# Patient Record
Sex: FEMALE | Race: WHITE | NOT HISPANIC OR LATINO | Employment: FULL TIME | URBAN - METROPOLITAN AREA
[De-identification: names, ages, dates, MRNs, and addresses within clinical notes are randomized per-mention and may not be internally consistent; named-entity substitution may affect disease eponyms.]

---

## 2019-04-28 ENCOUNTER — OFFICE VISIT (OUTPATIENT)
Dept: URGENT CARE | Facility: CLINIC | Age: 59
End: 2019-04-28
Payer: COMMERCIAL

## 2019-04-28 VITALS
WEIGHT: 183 LBS | RESPIRATION RATE: 16 BRPM | HEIGHT: 63 IN | OXYGEN SATURATION: 98 % | BODY MASS INDEX: 32.43 KG/M2 | HEART RATE: 79 BPM | DIASTOLIC BLOOD PRESSURE: 81 MMHG | TEMPERATURE: 99.2 F | SYSTOLIC BLOOD PRESSURE: 148 MMHG

## 2019-04-28 DIAGNOSIS — H66.012 ACUTE SUPPURATIVE OTITIS MEDIA OF LEFT EAR WITH SPONTANEOUS RUPTURE OF TYMPANIC MEMBRANE, RECURRENCE NOT SPECIFIED: Primary | ICD-10-CM

## 2019-04-28 PROCEDURE — G0382 LEV 3 HOSP TYPE B ED VISIT: HCPCS | Performed by: EMERGENCY MEDICINE

## 2019-04-28 RX ORDER — ROSUVASTATIN CALCIUM 20 MG/1
TABLET, COATED ORAL
COMMUNITY
Start: 2017-05-01

## 2019-04-28 RX ORDER — AMOXICILLIN AND CLAVULANATE POTASSIUM 875; 125 MG/1; MG/1
1 TABLET, FILM COATED ORAL EVERY 12 HOURS SCHEDULED
Qty: 20 TABLET | Refills: 0 | Status: SHIPPED | OUTPATIENT
Start: 2019-04-28 | End: 2019-05-08

## 2019-04-28 RX ORDER — CLONAZEPAM 0.5 MG/1
TABLET ORAL
COMMUNITY
Start: 2019-04-03

## 2019-04-28 RX ORDER — PREDNISONE 10 MG/1
TABLET ORAL
Qty: 27 TABLET | Refills: 0 | Status: SHIPPED | OUTPATIENT
Start: 2019-04-28

## 2024-01-23 ENCOUNTER — APPOINTMENT (EMERGENCY)
Dept: RADIOLOGY | Facility: HOSPITAL | Age: 64
End: 2024-01-23
Payer: MEDICARE

## 2024-01-23 ENCOUNTER — HOSPITAL ENCOUNTER (EMERGENCY)
Facility: HOSPITAL | Age: 64
Discharge: HOME | End: 2024-01-23
Attending: STUDENT IN AN ORGANIZED HEALTH CARE EDUCATION/TRAINING PROGRAM
Payer: MEDICARE

## 2024-01-23 VITALS
HEIGHT: 63 IN | BODY MASS INDEX: 32.78 KG/M2 | OXYGEN SATURATION: 96 % | DIASTOLIC BLOOD PRESSURE: 78 MMHG | SYSTOLIC BLOOD PRESSURE: 189 MMHG | RESPIRATION RATE: 18 BRPM | TEMPERATURE: 97.8 F | WEIGHT: 185 LBS | HEART RATE: 66 BPM

## 2024-01-23 DIAGNOSIS — R07.9 CHEST PAIN, UNSPECIFIED TYPE: Primary | ICD-10-CM

## 2024-01-23 DIAGNOSIS — I10 HYPERTENSION, UNSPECIFIED TYPE: ICD-10-CM

## 2024-01-23 LAB
ANION GAP SERPL CALCULATED.3IONS-SCNC: 6 MMOL/L
BASOPHILS # BLD AUTO: 0.06 THOUSANDS/ÂΜL (ref 0–0.1)
BASOPHILS NFR BLD AUTO: 1 % (ref 0–1)
BUN SERPL-MCNC: 16 MG/DL (ref 5–25)
CALCIUM SERPL-MCNC: 9.7 MG/DL (ref 8.4–10.2)
CARDIAC TROPONIN I PNL SERPL HS: <2 NG/L
CARDIAC TROPONIN I PNL SERPL HS: <2 NG/L
CHLORIDE SERPL-SCNC: 105 MMOL/L (ref 96–108)
CO2 SERPL-SCNC: 25 MMOL/L (ref 21–32)
CREAT SERPL-MCNC: 0.95 MG/DL (ref 0.6–1.3)
EOSINOPHIL # BLD AUTO: 0.18 THOUSAND/ÂΜL (ref 0–0.61)
EOSINOPHIL NFR BLD AUTO: 2 % (ref 0–6)
ERYTHROCYTE [DISTWIDTH] IN BLOOD BY AUTOMATED COUNT: 12.2 % (ref 11.6–15.1)
GFR SERPL CREATININE-BSD FRML MDRD: 63 ML/MIN/1.73SQ M
GLUCOSE SERPL-MCNC: 105 MG/DL (ref 65–140)
HCT VFR BLD AUTO: 36.6 % (ref 34.8–46.1)
HGB BLD-MCNC: 12.4 G/DL (ref 11.5–15.4)
IMM GRANULOCYTES # BLD AUTO: 0.03 THOUSAND/UL (ref 0–0.2)
IMM GRANULOCYTES NFR BLD AUTO: 0 % (ref 0–2)
LYMPHOCYTES # BLD AUTO: 1.6 THOUSANDS/ÂΜL (ref 0.6–4.47)
LYMPHOCYTES NFR BLD AUTO: 18 % (ref 14–44)
MCH RBC QN AUTO: 28.1 PG (ref 26.8–34.3)
MCHC RBC AUTO-ENTMCNC: 33.9 G/DL (ref 31.4–37.4)
MCV RBC AUTO: 83 FL (ref 82–98)
MONOCYTES # BLD AUTO: 0.63 THOUSAND/ÂΜL (ref 0.17–1.22)
MONOCYTES NFR BLD AUTO: 7 % (ref 4–12)
NEUTROPHILS # BLD AUTO: 6.19 THOUSANDS/ÂΜL (ref 1.85–7.62)
NEUTS SEG NFR BLD AUTO: 72 % (ref 43–75)
NRBC BLD AUTO-RTO: 0 /100 WBCS
PLATELET # BLD AUTO: 195 THOUSANDS/UL (ref 149–390)
PMV BLD AUTO: 10.3 FL (ref 8.9–12.7)
POTASSIUM SERPL-SCNC: 4.7 MMOL/L (ref 3.5–5.3)
RBC # BLD AUTO: 4.41 MILLION/UL (ref 3.81–5.12)
SODIUM SERPL-SCNC: 136 MMOL/L (ref 135–147)
WBC # BLD AUTO: 8.69 THOUSAND/UL (ref 4.31–10.16)

## 2024-01-23 PROCEDURE — 36415 COLL VENOUS BLD VENIPUNCTURE: CPT | Performed by: STUDENT IN AN ORGANIZED HEALTH CARE EDUCATION/TRAINING PROGRAM

## 2024-01-23 PROCEDURE — 99285 EMERGENCY DEPT VISIT HI MDM: CPT | Performed by: STUDENT IN AN ORGANIZED HEALTH CARE EDUCATION/TRAINING PROGRAM

## 2024-01-23 PROCEDURE — 84484 ASSAY OF TROPONIN QUANT: CPT | Performed by: STUDENT IN AN ORGANIZED HEALTH CARE EDUCATION/TRAINING PROGRAM

## 2024-01-23 PROCEDURE — 85025 COMPLETE CBC W/AUTO DIFF WBC: CPT | Performed by: STUDENT IN AN ORGANIZED HEALTH CARE EDUCATION/TRAINING PROGRAM

## 2024-01-23 PROCEDURE — 99285 EMERGENCY DEPT VISIT HI MDM: CPT

## 2024-01-23 PROCEDURE — 80048 BASIC METABOLIC PNL TOTAL CA: CPT | Performed by: STUDENT IN AN ORGANIZED HEALTH CARE EDUCATION/TRAINING PROGRAM

## 2024-01-23 PROCEDURE — 93005 ELECTROCARDIOGRAM TRACING: CPT

## 2024-01-23 PROCEDURE — 71045 X-RAY EXAM CHEST 1 VIEW: CPT

## 2024-01-23 PROCEDURE — 96360 HYDRATION IV INFUSION INIT: CPT

## 2024-01-23 PROCEDURE — 96361 HYDRATE IV INFUSION ADD-ON: CPT

## 2024-01-23 RX ORDER — AMLODIPINE BESYLATE 5 MG/1
5 TABLET ORAL ONCE
Status: COMPLETED | OUTPATIENT
Start: 2024-01-23 | End: 2024-01-23

## 2024-01-23 RX ORDER — AMLODIPINE BESYLATE 5 MG/1
5 TABLET ORAL DAILY
Qty: 14 TABLET | Refills: 0 | Status: SHIPPED | OUTPATIENT
Start: 2024-01-23

## 2024-01-23 RX ADMIN — SODIUM CHLORIDE 1000 ML: 0.9 INJECTION, SOLUTION INTRAVENOUS at 15:48

## 2024-01-23 RX ADMIN — AMLODIPINE BESYLATE 5 MG: 5 TABLET ORAL at 18:45

## 2024-01-23 NOTE — ED PROVIDER NOTES
"History  Chief Complaint   Patient presents with    Chest Pain     Pt. Voiced just happened today while grocery shopping just PTA to ED        History provided by:  Patient  Chest Pain  Pain location:  Substernal area  Pain quality: crushing, dull, pressure and radiating    Pain radiates to:  R jaw, L arm and L shoulder  Pain radiates to the back: yes    Pain severity:  Moderate  Onset quality:  Sudden  Duration:  5 minutes  Progression:  Resolved  Chronicity:  New  Context comment:  Pt states that she was at a grocery store when she developed chest discomfort. Sxs lasted for approx 5 minutes. No hx of recurrent chest pain. No known PMHx. Feels \"drained\" at this time.  Relieved by:  Nothing  Worsened by:  Nothing tried  Ineffective treatments:  None tried  Associated symptoms: back pain    Associated symptoms: no abdominal pain, no cough, no diaphoresis, no dizziness, no fatigue, no fever, no headache, no nausea, no near-syncope, no numbness, no palpitations, no shortness of breath, no syncope, not vomiting and no weakness      Prior to Admission Medications   Prescriptions Last Dose Informant Patient Reported? Taking?   rosuvastatin (CRESTOR) 20 MG tablet   Yes No   Sig: One pill by mouth once a day      Facility-Administered Medications: None       Past Medical History:   Diagnosis Date    High cholesterol     Insomnia disorder        Past Surgical History:   Procedure Laterality Date    NO PAST SURGERIES         History reviewed. No pertinent family history.  I have reviewed and agree with the history as documented.    E-Cigarette/Vaping    E-Cigarette Use Never User      E-Cigarette/Vaping Substances     Social History     Tobacco Use    Smoking status: Former    Smokeless tobacco: Never   Vaping Use    Vaping status: Never Used   Substance Use Topics    Alcohol use: Yes     Comment: socially       Review of Systems   Constitutional:  Negative for activity change, appetite change, diaphoresis, fatigue and fever. "   Respiratory:  Positive for chest tightness. Negative for cough, shortness of breath and wheezing.    Cardiovascular:  Positive for chest pain. Negative for palpitations, leg swelling, syncope and near-syncope.   Gastrointestinal:  Negative for abdominal pain, nausea and vomiting.   Musculoskeletal:  Positive for back pain. Negative for myalgias, neck pain and neck stiffness.   Skin:  Negative for color change, pallor, rash and wound.   Neurological:  Negative for dizziness, weakness, light-headedness, numbness and headaches.   All other systems reviewed and are negative.      Physical Exam  Physical Exam  Vitals and nursing note reviewed.   Constitutional:       General: She is not in acute distress.     Appearance: She is not ill-appearing or toxic-appearing.   HENT:      Head: Normocephalic and atraumatic.   Eyes:      Extraocular Movements: Extraocular movements intact.   Cardiovascular:      Rate and Rhythm: Normal rate and regular rhythm.      Heart sounds: Normal heart sounds. No murmur heard.  Pulmonary:      Effort: Pulmonary effort is normal. No tachypnea or accessory muscle usage.      Breath sounds: No decreased breath sounds, wheezing, rhonchi or rales.   Chest:      Chest wall: No tenderness.   Abdominal:      General: Bowel sounds are normal.      Palpations: Abdomen is soft.      Tenderness: There is no abdominal tenderness. There is no guarding or rebound.   Musculoskeletal:      Cervical back: Normal range of motion and neck supple.      Right lower leg: No tenderness. No edema.      Left lower leg: No tenderness. No edema.   Skin:     General: Skin is warm and dry.      Coloration: Skin is not cyanotic or pale.      Findings: No ecchymosis, erythema or rash.      Nails: There is no clubbing.   Neurological:      General: No focal deficit present.      Mental Status: She is alert and oriented to person, place, and time.      Cranial Nerves: No cranial nerve deficit.      Motor: No weakness.    Psychiatric:         Mood and Affect: Mood normal.         Behavior: Behavior normal.         Vital Signs  ED Triage Vitals [01/23/24 1525]   Temperature Pulse Respirations Blood Pressure SpO2   97.8 °F (36.6 °C) 88 18 (!) 208/85 99 %      Temp Source Heart Rate Source Patient Position - Orthostatic VS BP Location FiO2 (%)   Oral Monitor Lying Right arm --      Pain Score       No Pain           Vitals:    01/23/24 1623 01/23/24 1824 01/23/24 1840 01/23/24 1845   BP: 154/76 (!) 176/77 (!) 189/78 (!) 189/78   Pulse: 62 66 66    Patient Position - Orthostatic VS: Lying Sitting Lying          Visual Acuity      ED Medications  Medications   sodium chloride 0.9 % bolus 1,000 mL (0 mL Intravenous Stopped 1/23/24 1839)   amLODIPine (NORVASC) tablet 5 mg (5 mg Oral Given 1/23/24 1845)       Diagnostic Studies  Results Reviewed       Procedure Component Value Units Date/Time    HS Troponin I 2hr [[id number]] Collected: 01/23/24 1755    Lab Status: Final result Specimen: Blood from Arm, Right Updated: 01/23/24 1827     hs TnI 2hr <2 ng/L      Delta 2hr hsTnI --    HS Troponin 0hr (reflex protocol) [[id number]]  (Normal) Collected: 01/23/24 1545    Lab Status: Final result Specimen: Blood from Arm, Right Updated: 01/23/24 1614     hs TnI 0hr <2 ng/L     HS Troponin I 4hr [[id number]]     Lab Status: No result Specimen: Blood     CBC and differential [[id number]] Collected: 01/23/24 1608    Lab Status: Final result Specimen: Blood from Arm, Right Updated: 01/23/24 1613     WBC 8.69 Thousand/uL      RBC 4.41 Million/uL      Hemoglobin 12.4 g/dL      Hematocrit 36.6 %      MCV 83 fL      MCH 28.1 pg      MCHC 33.9 g/dL      RDW 12.2 %      MPV 10.3 fL      Platelets 195 Thousands/uL      nRBC 0 /100 WBCs      Neutrophils Relative 72 %      Immat GRANS % 0 %      Lymphocytes Relative 18 %      Monocytes Relative 7 %      Eosinophils Relative 2 %      Basophils Relative 1 %      Neutrophils Absolute 6.19 Thousands/µL      Immature  Grans Absolute 0.03 Thousand/uL      Lymphocytes Absolute 1.60 Thousands/µL      Monocytes Absolute 0.63 Thousand/µL      Eosinophils Absolute 0.18 Thousand/µL      Basophils Absolute 0.06 Thousands/µL     Basic metabolic panel [[id number]] Collected: 01/23/24 1545    Lab Status: Final result Specimen: Blood from Arm, Right Updated: 01/23/24 1609     Sodium 136 mmol/L      Potassium 4.7 mmol/L      Chloride 105 mmol/L      CO2 25 mmol/L      ANION GAP 6 mmol/L      BUN 16 mg/dL      Creatinine 0.95 mg/dL      Glucose 105 mg/dL      Calcium 9.7 mg/dL      eGFR 63 ml/min/1.73sq m     Narrative:      [employer] Kid[employer] guidelines for Chronic Kidney Disease (CKD):     Stage 1 with normal or high GFR (GFR > 90 mL/min/1.73 square meters)    Stage 2 Mild CKD (GFR = 60-89 mL/min/1.73 square meters)    Stage 3A Moderate CKD (GFR = 45-59 mL/min/1.73 square meters)    Stage 3B Moderate CKD (GFR = 30-44 mL/min/1.73 square meters)    Stage 4 Severe CKD (GFR = 15-29 mL/min/1.73 square meters)    Stage 5 End Stage CKD (GFR <15 mL/min/1.73 square meters)  Note: GFR calculation is accurate only with a steady state creatinine                   XR chest 1 view portable   ED Interpretation by Tremaine Tinoco DO (01/23 1609)   No acute findings.      Final Result by Sergio Denis MD (01/23 1637)      No acute cardiopulmonary disease.                  Workstation performed: [id number]                    Procedures  Procedures         ED Course  ED Course as of 01/23/24 1902 Tue Jan 23, 2024   1607 Chest pain workup.  CBC/BMP/troponin, chest x-ray.  At the time of my evaluation, the patient's chest pain has resolved.  ECG interpreted by me.  Normal sinus rhythm.  Heart rate 80 bpm.  No acute ischemic changes.  Normal axis.   1609 Chest x-ray interpreted by me.  No signs of acute findings.  Cardiac silhouette is not enlarged.  No signs of pneumothorax.  No focal infiltrates.  Mediastinum is not widened.  The patient's hx and clinical findings not suggestive of PE or aortic dissection.    1645 Blood pressure improving.  Last blood pressure 154/76.  Heart rate 62 bpm.  Troponin negative.  As aforementioned, ECG without acute ischemic changes.  ACS unlikely.   1827 Repeat troponin negative.   1830 Repeat troponin negative. /78. The patient is currently asymptomatic. Denies chest pain throughout the course of treatment in the ED. Will administer a dose of amlodipine. No signs of endorgan damage to suggest hypertensive emergency.  The patient will be discharged with a course of amlodipine 5 mg daily.  The patient was instructed to take her blood pressure twice daily and to follow-up with her primary care provider within the next 3 days.  Strict return precautions were discussed.  All questions addressed.  The patient was stable for discharge.             HEART Risk Score      Flowsheet Row Most Recent Value   Heart Score Risk Calculator    History 0 Filed at: 01/23/2024 1834   ECG 0 Filed at: 01/23/2024 1834   Age 2 Filed at: 01/23/2024 1834   Risk Factors 1 Filed at: 01/23/2024 1834   Troponin 0 Filed at: 01/23/2024 1834   HEART Score 3 Filed at: 01/23/2024 1834                          SBIRT 20yo+      Flowsheet Row Most Recent Value   Initial Alcohol Screen: US AUDIT-C     1. How often do you have a drink containing alcohol? 0 Filed at: 01/23/2024 1544   2. How many drinks containing alcohol do you have on a typical day you are drinking?  0 Filed at: 01/23/2024 1544   3b. FEMALE Any Age, or MALE 65+: How often do you have 4 or more drinks on one occassion? 0 Filed at: 01/23/2024 1544   Audit-C Score 0 Filed at: 01/23/2024 1544   NANCY: How many times in the past year have you...    Used an illegal drug or used a prescription medication for non-medical reasons? Never Filed at: 01/23/2024 1544                      Medical Decision Making  This patient presents with chest pain.   Diagnostic considerations include  ACS, PE, aortic dissection, PNA. See ED Course.         Problems Addressed:  Chest pain, unspecified type: acute illness or injury  Hypertension, unspecified type: acute illness or injury    Amount and/or Complexity of Data Reviewed  Labs: ordered. Decision-making details documented in ED Course.  Radiology: ordered and independent interpretation performed. Decision-making details documented in ED Course.  ECG/medicine tests: ordered and independent interpretation performed. Decision-making details documented in ED Course.    Risk  Prescription drug management.             Disposition  Final diagnoses:   Chest pain, unspecified type   Hypertension, unspecified type     Time reflects when diagnosis was documented in both MDM as applicable and the Disposition within this note       Time User Action Codes Description Comment    1/23/2024  6:35 PM Tremaine Tinoco [R07.9] Chest pain, unspecified type     1/23/2024  6:35 PM Tremaine Tinoco [I10] Hypertension, unspecified type           ED Disposition       ED Disposition   Discharge    Condition   Stable    Date/Time   Tue Jan 23, 2024 1834    Comment   Morelia Torres discharge to home/self care.                   Follow-up Information    None         Discharge Medication List as of 1/23/2024  6:36 PM        START taking these medications    Details   amLODIPine (NORVASC) 5 mg tablet Take 1 tablet (5 mg total) by mouth daily, Starting Tue 1/23/2024, Normal           CONTINUE these medications which have NOT CHANGED    Details   rosuvastatin (CRESTOR) 20 MG tablet One pill by mouth once a day, Historical Med             No discharge procedures on file.    PDMP Review       None            ED Provider  Electronically Signed by             Tremaine Tinoco DO  01/23/24 6230

## 2024-01-23 NOTE — DISCHARGE INSTRUCTIONS
The laboratory/imaging studies that were obtained did not show any significant abnormalities.    Given your elevated blood pressure, you are being prescribed a course of amlodipine.  Please take as directed.    Take your blood pressure twice daily and follow-up with your primary care provider within the next 3 days.    Return to the emergency department for any concerning signs or symptoms.

## 2024-01-24 LAB
ATRIAL RATE: 79 BPM
P AXIS: 46 DEGREES
PR INTERVAL: 158 MS
QRS AXIS: 14 DEGREES
QRSD INTERVAL: 76 MS
QT INTERVAL: 386 MS
QTC INTERVAL: 442 MS
T WAVE AXIS: 25 DEGREES
VENTRICULAR RATE: 79 BPM

## 2024-02-09 ENCOUNTER — OFFICE VISIT (OUTPATIENT)
Dept: URGENT CARE | Facility: CLINIC | Age: 64
End: 2024-02-09
Payer: MEDICARE

## 2024-02-09 VITALS
SYSTOLIC BLOOD PRESSURE: 128 MMHG | WEIGHT: 184 LBS | OXYGEN SATURATION: 99 % | RESPIRATION RATE: 18 BRPM | HEART RATE: 68 BPM | HEIGHT: 63 IN | BODY MASS INDEX: 32.6 KG/M2 | DIASTOLIC BLOOD PRESSURE: 82 MMHG | TEMPERATURE: 97.9 F

## 2024-02-09 DIAGNOSIS — K08.89 PAIN, DENTAL: Primary | ICD-10-CM

## 2024-02-09 PROCEDURE — 99213 OFFICE O/P EST LOW 20 MIN: CPT

## 2024-02-09 PROCEDURE — G0463 HOSPITAL OUTPT CLINIC VISIT: HCPCS

## 2024-02-09 RX ORDER — AMOXICILLIN AND CLAVULANATE POTASSIUM 875; 125 MG/1; MG/1
1 TABLET, FILM COATED ORAL EVERY 12 HOURS SCHEDULED
Qty: 20 TABLET | Refills: 0 | Status: SHIPPED | OUTPATIENT
Start: 2024-02-09 | End: 2024-02-16

## 2024-02-09 RX ORDER — MAGNESIUM 30 MG
TABLET ORAL
COMMUNITY

## 2024-02-09 RX ORDER — FAMOTIDINE 20 MG
TABLET ORAL
COMMUNITY

## 2024-02-09 RX ORDER — CLONAZEPAM 0.5 MG/1
0.5 TABLET, ORALLY DISINTEGRATING ORAL
COMMUNITY
Start: 2024-01-05

## 2024-02-09 NOTE — PATIENT INSTRUCTIONS
Take antibiotic as prescribed  Warm salt water gargles or mouthwash   Tylenol or ibuprofen as needed  Follow up with dentist  Follow up with PCP in 3-5 days.  Proceed to  ER if symptoms worsen.

## 2024-02-09 NOTE — PROGRESS NOTES
[facility]        NAME: Morelia Torres is a 63 y.o. female  : 1960    MRN: [id number]  DATE: 2024  TIME: 11:04 AM    Assessment and Plan   Pain, dental [K08.89]  1. Pain, dental  amoxicillin-clavulanate (AUGMENTIN) 875-125 mg per tablet            Patient Instructions     Take antibiotic as prescribed  Warm salt water gargles or mouthwash   Tylenol or ibuprofen as needed  Follow up with dentist  Follow up with PCP in 3-5 days.  Proceed to  ER if symptoms worsen.    Chief Complaint     Chief Complaint   Patient presents with    Jaw Pain     Right lower jaw pain x1 month and swelling x3 days. Started in cheek, went to jaw and now down right side of neck.   Had dental work done about a month ago.          History of Present Illness       Patient is presenting with progressing cheek/jaw pain.  She stated she has been having right lower jaw pain for 1 month and swelling for 3 days.  She stated it started at her cheek then went to her jaw and now down to her neck.  She stated that she did have a tooth filled on the bottom back tooth about 1 month ago and has been having to sensitivity since.     Dental Pain   Associated symptoms include difficulty swallowing (due to pain), facial pain and thermal sensitivity. Pertinent negatives include no fever, oral bleeding or sinus pressure.       Review of Systems   Review of Systems   Constitutional:  Negative for fever.   HENT:  Positive for dental problem and facial swelling. Negative for ear discharge, ear pain, rhinorrhea, sinus pressure, sinus pain, trouble swallowing and voice change.    Respiratory: Negative.     Cardiovascular: Negative.          Current Medications       Current Outpatient Medications:     amoxicillin-clavulanate (AUGMENTIN) 875-125 mg per tablet, Take 1 tablet by mouth every 12 (twelve) hours for 10 days, Disp: 20 tablet, Rfl: 0    clonazePAM (KlonoPIN) 0.5 MG disintegrating tablet, Take 0.5 mg by mouth daily at bedtime,  "Disp: , Rfl:     magnesium 30 MG tablet, Take by mouth, Disp: , Rfl:     rosuvastatin (CRESTOR) 20 MG tablet, One pill by mouth once a day, Disp: , Rfl:     Vitamin D, Cholecalciferol, 25 MCG (1000 UT) CAPS, Take by mouth, Disp: , Rfl:     Zinc Sulfate 66 MG TABS, Take by mouth, Disp: , Rfl:     amLODIPine (NORVASC) 5 mg tablet, Take 1 tablet (5 mg total) by mouth daily (Patient not taking: Reported on 2/9/2024), Disp: 14 tablet, Rfl: 0    Current Allergies     Allergies as of 02/09/2024    (No Known Allergies)            The following portions of the patient's history were reviewed and updated as appropriate: allergies, current medications, past family history, past medical history, past social history, past surgical history and problem list.     Past Medical History:   Diagnosis Date    High cholesterol     Insomnia disorder     Sciatic nerve pain        Past Surgical History:   Procedure Laterality Date    NO PAST SURGERIES         Family History   Problem Relation Age of Onset    Lung cancer Mother 85    No Known Problems Father          Medications have been verified.        Objective   /82   Pulse 68   Temp 97.9 °F (36.6 °C)   Resp 18   Ht 5' 3\" (1.6 m)   Wt 83.5 kg (184 lb)   SpO2 99%   BMI 32.59 kg/m²        Physical Exam     Physical Exam  Constitutional:       Appearance: Normal appearance.   HENT:      Head: Normocephalic.      Right Ear: Tympanic membrane and external ear normal.      Left Ear: Tympanic membrane and external ear normal.      Nose: Nose normal.      Mouth/Throat:      Mouth: Mucous membranes are moist.      Pharynx: Oropharynx is clear. No oropharyngeal exudate or posterior oropharyngeal erythema.      Comments: Cheek swelling on R lower jaw with TTP  Eyes:      Extraocular Movements: Extraocular movements intact.      Pupils: Pupils are equal, round, and reactive to light.   Cardiovascular:      Rate and Rhythm: Normal rate and regular rhythm.      Pulses: Normal pulses.     "  Heart sounds: Normal heart sounds.   Pulmonary:      Effort: Pulmonary effort is normal.      Breath sounds: Normal breath sounds.   Neurological:      Mental Status: She is alert.

## 2024-02-16 ENCOUNTER — HOSPITAL ENCOUNTER (OUTPATIENT)
Dept: NON INVASIVE DIAGNOSTICS | Facility: HOSPITAL | Age: 64
Discharge: HOME | End: 2024-02-16
Payer: MEDICARE

## 2024-02-16 DIAGNOSIS — R03.0 ELEVATED BLOOD PRESSURE READING WITHOUT DIAGNOSIS OF HYPERTENSION: ICD-10-CM

## 2024-02-16 DIAGNOSIS — R07.9 CHEST PAIN, UNSPECIFIED TYPE: ICD-10-CM

## 2024-02-16 DIAGNOSIS — E78.5 HYPERLIPIDEMIA LDL GOAL <100: ICD-10-CM

## 2024-02-16 LAB
MAX HR PERCENT: 89 %
MAX HR: 139 BPM
RATE PRESSURE PRODUCT: NORMAL
SL CV LV EF: 60
SL CV STRESS RECOVERY BP: NORMAL MMHG
SL CV STRESS RECOVERY HR: 99 BPM
SL CV STRESS RECOVERY O2 SAT: 99 %
SL CV STRESS STAGE REACHED: 3
STRESS ANGINA INDEX: 0
STRESS BASELINE BP: NORMAL MMHG
STRESS BASELINE HR: 64 BPM
STRESS O2 SAT REST: 98 %
STRESS PEAK HR: 139 BPM
STRESS POST ESTIMATED WORKLOAD: 7 METS
STRESS POST EXERCISE DUR MIN: 6 MIN
STRESS POST EXERCISE DUR SEC: 21 SEC
STRESS POST O2 SAT PEAK: 97 %
STRESS POST PEAK BP: 182 MMHG

## 2024-02-16 PROCEDURE — 93350 STRESS TTE ONLY: CPT

## 2024-02-16 PROCEDURE — 93350 STRESS TTE ONLY: CPT | Performed by: STUDENT IN AN ORGANIZED HEALTH CARE EDUCATION/TRAINING PROGRAM

## 2024-02-17 LAB
CHEST PAIN STATEMENT: NORMAL
MAX DIASTOLIC BP: 72 MMHG
MAX PREDICTED HEART RATE: 155 BPM
PROTOCOL NAME: NORMAL
REASON FOR TERMINATION: NORMAL
STRESS POST EXERCISE DUR MIN: 6 MIN
STRESS POST EXERCISE DUR SEC: 21 SEC
STRESS POST PEAK HR: 139 BPM
STRESS POST PEAK SYSTOLIC BP: 180 MMHG
TARGET HR FORMULA: NORMAL
TEST INDICATION: NORMAL

## 2024-11-19 DIAGNOSIS — R06.09 OTHER FORMS OF DYSPNEA: ICD-10-CM

## 2024-11-19 DIAGNOSIS — E66.1 DRUG-INDUCED OBESITY: ICD-10-CM

## 2024-11-19 DIAGNOSIS — E66.811 OBESITY, CLASS 1: ICD-10-CM

## 2024-11-19 DIAGNOSIS — R03.0 ELEVATED BLOOD-PRESSURE READING, WITHOUT DIAGNOSIS OF HYPERTENSION: ICD-10-CM

## 2024-11-29 ENCOUNTER — TELEPHONE (OUTPATIENT)
Dept: MRI IMAGING | Facility: HOSPITAL | Age: 64
End: 2024-11-29

## 2024-12-04 ENCOUNTER — HOSPITAL ENCOUNTER (OUTPATIENT)
Dept: CT IMAGING | Facility: HOSPITAL | Age: 64
Discharge: HOME | End: 2024-12-04
Payer: MEDICARE

## 2024-12-04 VITALS — SYSTOLIC BLOOD PRESSURE: 117 MMHG | HEART RATE: 76 BPM | RESPIRATION RATE: 18 BRPM | DIASTOLIC BLOOD PRESSURE: 55 MMHG

## 2024-12-04 DIAGNOSIS — R03.0 ELEVATED BLOOD-PRESSURE READING, WITHOUT DIAGNOSIS OF HYPERTENSION: ICD-10-CM

## 2024-12-04 DIAGNOSIS — E66.1 DRUG-INDUCED OBESITY: ICD-10-CM

## 2024-12-04 DIAGNOSIS — E66.811 OBESITY, CLASS 1: ICD-10-CM

## 2024-12-04 DIAGNOSIS — R06.09 OTHER FORMS OF DYSPNEA: ICD-10-CM

## 2024-12-04 PROCEDURE — 75574 CT ANGIO HRT W/3D IMAGE: CPT

## 2024-12-04 RX ORDER — NITROGLYCERIN 0.4 MG/1
0.8 TABLET SUBLINGUAL ONCE
Status: COMPLETED | OUTPATIENT
Start: 2024-12-04 | End: 2024-12-04

## 2024-12-04 RX ORDER — METOPROLOL TARTRATE 1 MG/ML
5 INJECTION, SOLUTION INTRAVENOUS
Status: DISCONTINUED | OUTPATIENT
Start: 2024-12-04 | End: 2024-12-08 | Stop reason: HOSPADM

## 2024-12-04 RX ADMIN — IOHEXOL 68 ML: 350 INJECTION, SOLUTION INTRAVENOUS at 10:10

## 2024-12-04 RX ADMIN — NITROGLYCERIN 0.8 MG: 0.4 TABLET SUBLINGUAL at 09:55

## 2024-12-04 NOTE — NURSING NOTE
Patient arrived for coronary CT angiography. Test education reviewed with patient. Medications and allergies verified. Pt denies PDE5 inhibitor/pulmonary htn medication use. Patient took Metoprolol 100mg as instructed by cardiology. SL nitro given per protocol. See vitals flowsheet. Tolerated test well. Instructed to increase fluid intake remainder of day. Vitals stable, patient asymptomatic upon departure.